# Patient Record
(demographics unavailable — no encounter records)

---

## 2024-12-11 NOTE — PHYSICAL EXAM
[Appropriately responsive] : appropriately responsive [Alert] : alert [No Acute Distress] : no acute distress [Soft] : soft [Non-tender] : non-tender [Examination Of The Breasts] : a normal appearance [Normal] : normal [No Discharge] : no discharge [No Masses] : no breast masses were palpable

## 2024-12-14 NOTE — PLAN
[FreeTextEntry1] : 40 yo presents with galactorrhea bilaterally, UTD on pap smears and will send records from obgyn visit in July 2024  #galactorrhea -mammogram results indicate need for diagnostic mammogram (given dense breasts as well as area of asymmetry in the right breast), referral sent -bilateral breast ultrasound referral provided given dense breasts -TSH and prolactin levels drawn -will f/u results -all questions answered and patient expressed understanding  NS PGY2

## 2024-12-14 NOTE — HISTORY OF PRESENT ILLNESS
[Normal Amount/Duration] :  normal amount and duration [Frequency: Q ___ days] : menstrual periods occur approximately every [unfilled] days [Currently Active] : currently active [Men] : men [No] : No [Patient refuses STI testing] : Patient refuses STI testing [FreeTextEntry1] : 11/17/2024